# Patient Record
Sex: FEMALE
[De-identification: names, ages, dates, MRNs, and addresses within clinical notes are randomized per-mention and may not be internally consistent; named-entity substitution may affect disease eponyms.]

---

## 2017-01-25 ENCOUNTER — RX ONLY (OUTPATIENT)
Age: 37
Setting detail: RX ONLY
End: 2017-01-25

## 2017-01-25 ENCOUNTER — APPOINTMENT (RX ONLY)
Dept: URBAN - METROPOLITAN AREA CLINIC 158 | Facility: CLINIC | Age: 37
Setting detail: DERMATOLOGY
End: 2017-01-25

## 2017-01-25 DIAGNOSIS — L70.0 ACNE VULGARIS: ICD-10-CM

## 2017-01-25 PROCEDURE — ? COUNSELING

## 2017-01-25 PROCEDURE — ? TREATMENT REGIMEN

## 2017-01-25 PROCEDURE — ? PRESCRIPTION

## 2017-01-25 PROCEDURE — 99202 OFFICE O/P NEW SF 15 MIN: CPT

## 2017-01-25 RX ORDER — SPIRONOLACTONE 25 MG/1
1 TABLET, FILM COATED ORAL BID
Qty: 60 | Refills: 5 | Status: ERX | COMMUNITY
Start: 2017-01-25

## 2017-01-25 RX ORDER — SPIRONOLACTONE 25 MG/1
TABLET, FILM COATED ORAL
Qty: 180 | Refills: 1 | Status: ERX

## 2017-01-25 RX ORDER — CLINDAMYCIN PHOSPHATE 1 %
USE SWAB, MEDICATED TOPICAL DAILY
Qty: 1 | Refills: 5 | Status: ERX | COMMUNITY
Start: 2017-01-25

## 2017-01-25 RX ORDER — TRETINOIN 1 MG/G
APPLY CREAM TOPICAL AT BEDTIME
Qty: 45 | Refills: 5 | Status: ERX | COMMUNITY
Start: 2017-01-25

## 2017-01-25 RX ORDER — DAPSONE 75 MG/G
APPLY GEL TOPICAL
Qty: 90 | Refills: 11 | Status: ERX | COMMUNITY
Start: 2017-01-25

## 2017-01-25 RX ADMIN — Medication USE: at 15:45

## 2017-01-25 RX ADMIN — DAPSONE APPLY: 75 GEL TOPICAL at 15:45

## 2017-01-25 RX ADMIN — SPIRONOLACTONE 1: 25 TABLET, FILM COATED ORAL at 15:45

## 2017-01-25 RX ADMIN — TRETINOIN APPLY: 1 CREAM TOPICAL at 15:45

## 2017-01-25 ASSESSMENT — SEVERITY ASSESSMENT OVERALL AMONG ALL PATIENTS
IN YOUR EXPERIENCE, AMONG ALL PATIENTS YOU HAVE SEEN WITH THIS CONDITION, HOW SEVERE IS THIS PATIENT'S CONDITION?: INFLAMMATORY LESIONS MORE APPARENT; MANY COMEDONES AND PAPULES/PUSTULES, +/- FEW NODULOCYSTIC LESIONS

## 2017-01-25 ASSESSMENT — LOCATION ZONE DERM: LOCATION ZONE: FACE

## 2017-01-25 ASSESSMENT — LOCATION SIMPLE DESCRIPTION DERM: LOCATION SIMPLE: LEFT CHEEK

## 2017-01-25 ASSESSMENT — LOCATION DETAILED DESCRIPTION DERM: LOCATION DETAILED: LEFT CENTRAL MALAR CHEEK

## 2017-01-25 NOTE — PROCEDURE: TREATMENT REGIMEN
Samples Given: Aczone copay card(no samples available)
Initiate Treatment: Aczone 7.5% in the morning, tretinoin 0.1% cream at bedtime, Clindamycin pledgets once daily, and spironolactone 25mg BID
Detail Level: Zone
Discontinue Regimen: doxycycline 100mg BID and ziana at bedtime

## 2017-01-25 NOTE — HPI: PIMPLES (ACNE)
Is This A New Presentation, Or A Follow-Up?: Acne
How Severe Is Your Acne?: moderate
Additional Comments (Use Complete Sentences): Pt states she has been on doxycycline x 1 year with some improvement and has been using ziana x 1 month w/ no irritation. Pt states her face is very oily and she does notice her acne worsens around her cycle.